# Patient Record
Sex: MALE | Race: ASIAN | NOT HISPANIC OR LATINO | URBAN - METROPOLITAN AREA
[De-identification: names, ages, dates, MRNs, and addresses within clinical notes are randomized per-mention and may not be internally consistent; named-entity substitution may affect disease eponyms.]

---

## 2018-10-18 ENCOUNTER — EMERGENCY (EMERGENCY)
Facility: HOSPITAL | Age: 63
LOS: 1 days | Discharge: HOME | End: 2018-10-18
Attending: EMERGENCY MEDICINE | Admitting: EMERGENCY MEDICINE
Payer: COMMERCIAL

## 2018-10-18 VITALS
RESPIRATION RATE: 17 BRPM | SYSTOLIC BLOOD PRESSURE: 160 MMHG | TEMPERATURE: 98 F | DIASTOLIC BLOOD PRESSURE: 89 MMHG | OXYGEN SATURATION: 98 % | HEART RATE: 98 BPM

## 2018-10-18 DIAGNOSIS — Z98.890 OTHER SPECIFIED POSTPROCEDURAL STATES: Chronic | ICD-10-CM

## 2018-10-18 LAB
ALBUMIN SERPL ELPH-MCNC: 4.3 G/DL — SIGNIFICANT CHANGE UP (ref 3.3–5)
ALP SERPL-CCNC: 100 U/L — SIGNIFICANT CHANGE UP (ref 40–120)
ALT FLD-CCNC: 29 U/L — SIGNIFICANT CHANGE UP (ref 4–41)
APTT BLD: 31.5 SEC — SIGNIFICANT CHANGE UP (ref 27.5–37.4)
AST SERPL-CCNC: 27 U/L — SIGNIFICANT CHANGE UP (ref 4–40)
BASOPHILS # BLD AUTO: 0.01 K/UL — SIGNIFICANT CHANGE UP (ref 0–0.2)
BASOPHILS NFR BLD AUTO: 0.2 % — SIGNIFICANT CHANGE UP (ref 0–2)
BILIRUB SERPL-MCNC: 0.7 MG/DL — SIGNIFICANT CHANGE UP (ref 0.2–1.2)
BUN SERPL-MCNC: 14 MG/DL — SIGNIFICANT CHANGE UP (ref 7–23)
CALCIUM SERPL-MCNC: 8.9 MG/DL — SIGNIFICANT CHANGE UP (ref 8.4–10.5)
CHLORIDE SERPL-SCNC: 99 MMOL/L — SIGNIFICANT CHANGE UP (ref 98–107)
CO2 SERPL-SCNC: 25 MMOL/L — SIGNIFICANT CHANGE UP (ref 22–31)
CREAT SERPL-MCNC: 0.96 MG/DL — SIGNIFICANT CHANGE UP (ref 0.5–1.3)
EOSINOPHIL # BLD AUTO: 0.04 K/UL — SIGNIFICANT CHANGE UP (ref 0–0.5)
EOSINOPHIL NFR BLD AUTO: 0.6 % — SIGNIFICANT CHANGE UP (ref 0–6)
GLUCOSE SERPL-MCNC: 108 MG/DL — HIGH (ref 70–99)
HCT VFR BLD CALC: 40.3 % — SIGNIFICANT CHANGE UP (ref 39–50)
HGB BLD-MCNC: 13.5 G/DL — SIGNIFICANT CHANGE UP (ref 13–17)
IMM GRANULOCYTES # BLD AUTO: 0.01 # — SIGNIFICANT CHANGE UP
IMM GRANULOCYTES NFR BLD AUTO: 0.2 % — SIGNIFICANT CHANGE UP (ref 0–1.5)
INR BLD: 1.05 — SIGNIFICANT CHANGE UP (ref 0.88–1.17)
LYMPHOCYTES # BLD AUTO: 2.29 K/UL — SIGNIFICANT CHANGE UP (ref 1–3.3)
LYMPHOCYTES # BLD AUTO: 35.7 % — SIGNIFICANT CHANGE UP (ref 13–44)
MCHC RBC-ENTMCNC: 29.8 PG — SIGNIFICANT CHANGE UP (ref 27–34)
MCHC RBC-ENTMCNC: 33.5 % — SIGNIFICANT CHANGE UP (ref 32–36)
MCV RBC AUTO: 89 FL — SIGNIFICANT CHANGE UP (ref 80–100)
MONOCYTES # BLD AUTO: 0.58 K/UL — SIGNIFICANT CHANGE UP (ref 0–0.9)
MONOCYTES NFR BLD AUTO: 9 % — SIGNIFICANT CHANGE UP (ref 2–14)
NEUTROPHILS # BLD AUTO: 3.48 K/UL — SIGNIFICANT CHANGE UP (ref 1.8–7.4)
NEUTROPHILS NFR BLD AUTO: 54.3 % — SIGNIFICANT CHANGE UP (ref 43–77)
NRBC # FLD: 0 — SIGNIFICANT CHANGE UP
PLATELET # BLD AUTO: 186 K/UL — SIGNIFICANT CHANGE UP (ref 150–400)
PMV BLD: 10.3 FL — SIGNIFICANT CHANGE UP (ref 7–13)
POTASSIUM SERPL-MCNC: 4.4 MMOL/L — SIGNIFICANT CHANGE UP (ref 3.5–5.3)
POTASSIUM SERPL-SCNC: 4.4 MMOL/L — SIGNIFICANT CHANGE UP (ref 3.5–5.3)
PROT SERPL-MCNC: 8.1 G/DL — SIGNIFICANT CHANGE UP (ref 6–8.3)
PROTHROM AB SERPL-ACNC: 11.7 SEC — SIGNIFICANT CHANGE UP (ref 9.8–13.1)
RBC # BLD: 4.53 M/UL — SIGNIFICANT CHANGE UP (ref 4.2–5.8)
RBC # FLD: 13.3 % — SIGNIFICANT CHANGE UP (ref 10.3–14.5)
SODIUM SERPL-SCNC: 138 MMOL/L — SIGNIFICANT CHANGE UP (ref 135–145)
TROPONIN T, HIGH SENSITIVITY: < 6 NG/L — SIGNIFICANT CHANGE UP (ref ?–14)
WBC # BLD: 6.41 K/UL — SIGNIFICANT CHANGE UP (ref 3.8–10.5)
WBC # FLD AUTO: 6.41 K/UL — SIGNIFICANT CHANGE UP (ref 3.8–10.5)

## 2018-10-18 PROCEDURE — 99284 EMERGENCY DEPT VISIT MOD MDM: CPT

## 2018-10-18 PROCEDURE — 71046 X-RAY EXAM CHEST 2 VIEWS: CPT | Mod: 26

## 2018-10-18 RX ORDER — KETOROLAC TROMETHAMINE 30 MG/ML
15 SYRINGE (ML) INJECTION ONCE
Qty: 0 | Refills: 0 | Status: DISCONTINUED | OUTPATIENT
Start: 2018-10-18 | End: 2018-10-18

## 2018-10-18 RX ORDER — ASPIRIN/CALCIUM CARB/MAGNESIUM 324 MG
81 TABLET ORAL ONCE
Qty: 0 | Refills: 0 | Status: COMPLETED | OUTPATIENT
Start: 2018-10-18 | End: 2018-10-18

## 2018-10-18 RX ADMIN — Medication 15 MILLIGRAM(S): at 19:58

## 2018-10-18 RX ADMIN — Medication 81 MILLIGRAM(S): at 19:58

## 2018-10-18 RX ADMIN — Medication 15 MILLIGRAM(S): at 19:34

## 2018-10-18 NOTE — ED ADULT NURSE NOTE - OBJECTIVE STATEMENT
Pt is a 63 year old make reporting chest pain. Pt states pain started a week ago and got worse today. Pt said pain was a 10 out of 10 but now subsided to 5 out of 10. Pt denies any aggravating factors. Pt denies taking pain medication for pain relief. Pt states taking simvastatin for high cholesterol and cyclobenzaprine for neck pain. Pt reposts neck and shoulder pain unrelated to chest pain and saw a chiropractor on Tuesday. Pt airway patent, respirations are even and unlabored. PT denies any SOB or difficulty breathing. Pt VSS, placed on a cardiac monitor .Pt skin flesh tone, warm and intact. 20 gauge IV placed in left hand, labs drawn, pending results. Pt wife at bedside.

## 2018-10-18 NOTE — ED ADULT NURSE REASSESSMENT NOTE - NS ED NURSE REASSESS COMMENT FT1
Pt in bed with wife at bedside. Pt given 15 mg torodol. pt reports pain 5 of 10. Pt on continuos Cardiac monitor, will continue to monitor.
Pt in NAD, calm and cooperative. Pt states pain is a 0 out of 10 after administration of Torodol. Administered 81 mg Asprin. Will continue to monitor.

## 2018-10-18 NOTE — ED PROVIDER NOTE - MEDICAL DECISION MAKING DETAILS
62yo male with 7 hours chest pain,   HTN, HLD, atypical chest pain, will get cxr, ekg, ce, cards follow up

## 2018-10-18 NOTE — ED PROVIDER NOTE - OBJECTIVE STATEMENT
62yo male hld, htn p/w chest pain. Pain came on suddnely while driving, has been constant x7 hours but improving on its own,. not worse with exertion, no SOB, no cough, no fevers, no chills. had similar pain inpast which improved with bengay. no trauma to area. no h/o dvt/pe, no recent travel or surgeries, no leg swelling/pain  + former smoker

## 2018-10-18 NOTE — ED PROVIDER NOTE - ATTENDING CONTRIBUTION TO CARE
AJM: Patient seen with resident and agree with above note. 62yo male hld, htn p/w chest pain. Pain came on suddnely while driving, has been constant x7 hours but improving on its own,. not worse with exertion, no SOB, no cough, no fevers, no chills. had similar pain inpast which improved with bengay. no trauma to area. no h/o dvt/pe, no recent travel or surgeries, no leg swelling/pain. normal neuro and cardiovascular exam. will r.o acs, pain control. likely dc with pmd follow up this week. pt comfortable with plan.

## 2018-10-18 NOTE — ED SUB INTERN NOTE - NS MSEMN COMPLAINT FT
63 yoM w/ PMHx of HLD, HTN presents w/ sudden onset of L chest pain around 12-1pm today. States the pain is sharp w/ some radiation into L shoulder and slightly worsened w/ deep inhalation. He was driving when the chest pain came on w/ no obvious inciting event. Was 9-10/10 initially, but is gradually getting better and currently 4-5/10. Denies taking anything for pain. Admits to a similar pain about 1 week ago in which he used Bengay cream to alleviate sx. Denies any trauma, previous ACS, diaphoresis, leg swelling, recent travel, n/v.

## 2018-10-18 NOTE — ED PROVIDER NOTE - PROGRESS NOTE DETAILS
[place]: pain improving, xr and labs unremarkable, trop <6, given copy of results and cards follow up

## 2018-10-18 NOTE — ED ADULT NURSE NOTE - CHPI ED NUR SYMPTOMS NEG
no shortness of breath/no vomiting/no nausea/no dizziness/no chills/no fever/no diaphoresis/no congestion/no syncope

## 2019-01-17 NOTE — ED ADULT NURSE NOTE - IN THE PAST YEAR, HOW OFTEN HAVE YOU USED TOBACCO PRODUCTS?
Call placed to patient to notify. Patient notified. Patient verbalized understanding of information given. Never
